# Patient Record
Sex: FEMALE | Race: WHITE | Employment: FULL TIME | URBAN - METROPOLITAN AREA
[De-identification: names, ages, dates, MRNs, and addresses within clinical notes are randomized per-mention and may not be internally consistent; named-entity substitution may affect disease eponyms.]

---

## 2017-01-11 ENCOUNTER — HOSPITAL ENCOUNTER (OUTPATIENT)
Age: 62
Discharge: HOME | End: 2017-01-11
Payer: COMMERCIAL

## 2017-01-11 VITALS
BODY MASS INDEX: 29.82 KG/M2 | DIASTOLIC BLOOD PRESSURE: 85 MMHG | HEIGHT: 67 IN | TEMPERATURE: 98 F | WEIGHT: 190 LBS | HEART RATE: 78 BPM | RESPIRATION RATE: 20 BRPM | SYSTOLIC BLOOD PRESSURE: 147 MMHG | OXYGEN SATURATION: 100 %

## 2017-01-11 DIAGNOSIS — J02.0 STREPTOCOCCAL SORE THROAT: Primary | ICD-10-CM

## 2017-01-11 LAB — S PYO AG THROAT QL: POSITIVE

## 2017-01-11 PROCEDURE — 99214 OFFICE O/P EST MOD 30 MIN: CPT

## 2017-01-11 PROCEDURE — 99213 OFFICE O/P EST LOW 20 MIN: CPT

## 2017-01-11 PROCEDURE — 87430 STREP A AG IA: CPT

## 2017-01-11 RX ORDER — LISINOPRIL 10 MG/1
10 TABLET ORAL DAILY
COMMUNITY

## 2017-01-11 RX ORDER — AMOXICILLIN 875 MG/1
875 TABLET, COATED ORAL 2 TIMES DAILY
Qty: 20 TABLET | Refills: 0 | Status: SHIPPED | OUTPATIENT
Start: 2017-01-11 | End: 2017-01-21

## 2017-01-11 NOTE — ED PROVIDER NOTES
Patient presents with:  Cough/URI      HPI:     Shante Hinojosa is a 62year old female who presents for evaluation of a chief complaint of sore throat the past couple days with body aches and chills. No fevers.   She states she has had intermittent URI redness noted, uvula midline and airway patent  LUNGS: clear to auscultation bilaterally; no rales, rhonchi, or wheezes    MDM/Assessment/Plan:   Orders for this encounter:      Orders Placed This Encounter  POCT Rapid Strep  amoxicillin 875 MG Oral Tab

## 2017-02-13 ENCOUNTER — HOSPITAL ENCOUNTER (OUTPATIENT)
Age: 62
Discharge: HOME | End: 2017-02-13
Attending: EMERGENCY MEDICINE
Payer: COMMERCIAL

## 2017-02-13 VITALS
OXYGEN SATURATION: 100 % | HEART RATE: 81 BPM | DIASTOLIC BLOOD PRESSURE: 83 MMHG | HEIGHT: 67 IN | SYSTOLIC BLOOD PRESSURE: 135 MMHG | TEMPERATURE: 98 F | BODY MASS INDEX: 28.25 KG/M2 | WEIGHT: 180 LBS | RESPIRATION RATE: 20 BRPM

## 2017-02-13 DIAGNOSIS — H01.00B BLEPHARITIS OF UPPER AND LOWER EYELIDS OF BOTH EYES, UNSPECIFIED TYPE: Primary | ICD-10-CM

## 2017-02-13 DIAGNOSIS — H01.00A BLEPHARITIS OF UPPER AND LOWER EYELIDS OF BOTH EYES, UNSPECIFIED TYPE: Primary | ICD-10-CM

## 2017-02-13 PROCEDURE — 99214 OFFICE O/P EST MOD 30 MIN: CPT

## 2017-02-13 PROCEDURE — 99213 OFFICE O/P EST LOW 20 MIN: CPT

## 2017-02-13 RX ORDER — KETOTIFEN FUMARATE 0.35 MG/ML
1 SOLUTION/ DROPS OPHTHALMIC 2 TIMES DAILY
Qty: 10 ML | Refills: 0 | Status: SHIPPED | OUTPATIENT
Start: 2017-02-13 | End: 2017-02-23

## 2017-02-13 NOTE — ED INITIAL ASSESSMENT (HPI)
Pt reports irritation around eyes for about 3 weeks. States skin itching and flaking. Eye lids and under both eyes.  Denies change in makeup and detergent

## 2017-02-13 NOTE — ED PROVIDER NOTES
Patient presents with:  Rash Skin Problem (integumentary)      HPI:     Mary Reyes is a 62year old female who presents today with a chief complaint of bilateral eyelid swelling and itchy sensation. Onset of symptoms was in the past week.     Sympto (two) times daily. Dispense:  10 mL   Refill:  0    Labs performed this visit:  No results found for this or any previous visit (from the past 10 hour(s)). Diagnosis:    ICD-10-CM    1.  Blepharitis of upper and lower eyelids of both eyes, unspecified

## 2017-04-06 ENCOUNTER — HOSPITAL ENCOUNTER (OUTPATIENT)
Age: 62
Discharge: HOME | End: 2017-04-06
Attending: EMERGENCY MEDICINE
Payer: COMMERCIAL

## 2017-04-06 VITALS
BODY MASS INDEX: 28.25 KG/M2 | RESPIRATION RATE: 18 BRPM | SYSTOLIC BLOOD PRESSURE: 149 MMHG | HEART RATE: 69 BPM | DIASTOLIC BLOOD PRESSURE: 76 MMHG | HEIGHT: 67 IN | WEIGHT: 180 LBS | TEMPERATURE: 98 F

## 2017-04-06 DIAGNOSIS — J02.0 STREP PHARYNGITIS: Primary | ICD-10-CM

## 2017-04-06 PROCEDURE — 99214 OFFICE O/P EST MOD 30 MIN: CPT

## 2017-04-06 PROCEDURE — 87430 STREP A AG IA: CPT

## 2017-04-06 PROCEDURE — 99213 OFFICE O/P EST LOW 20 MIN: CPT

## 2017-04-06 RX ORDER — LISINOPRIL 20 MG/1
TABLET ORAL
Refills: 0 | COMMUNITY
Start: 2017-01-19 | End: 2017-04-06

## 2017-04-06 RX ORDER — PENICILLIN V POTASSIUM 500 MG/1
500 TABLET ORAL 4 TIMES DAILY
Qty: 40 TABLET | Refills: 0 | Status: SHIPPED | OUTPATIENT
Start: 2017-04-06 | End: 2017-04-16

## 2017-04-06 NOTE — ED INITIAL ASSESSMENT (HPI)
Couple days of bilateral ear pressure, sore throat, sinus pressure. No fever. C/o dizziness with bending over. No N/V/D.  Recently traveled to [facility].

## 2017-04-06 NOTE — ED PROVIDER NOTES
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 Cardiovascular: Normal rate, regular rhythm and normal heart sounds. Pulmonary/Chest: Effort normal.   Musculoskeletal: Normal range of motion. Lymphadenopathy:     She has cervical adenopathy.    Neurological: She is alert and o

## 2017-05-05 ENCOUNTER — HOSPITAL ENCOUNTER (OUTPATIENT)
Age: 62
Discharge: HOME | End: 2017-05-05
Payer: COMMERCIAL

## 2017-05-05 VITALS
HEART RATE: 101 BPM | TEMPERATURE: 98 F | OXYGEN SATURATION: 100 % | SYSTOLIC BLOOD PRESSURE: 149 MMHG | RESPIRATION RATE: 18 BRPM | DIASTOLIC BLOOD PRESSURE: 84 MMHG

## 2017-05-05 DIAGNOSIS — J02.9 ACUTE VIRAL PHARYNGITIS: Primary | ICD-10-CM

## 2017-05-05 PROCEDURE — 87430 STREP A AG IA: CPT

## 2017-05-05 PROCEDURE — 99213 OFFICE O/P EST LOW 20 MIN: CPT

## 2017-05-05 PROCEDURE — 99214 OFFICE O/P EST MOD 30 MIN: CPT

## 2017-05-05 RX ORDER — AZITHROMYCIN 500 MG/1
500 TABLET, FILM COATED ORAL DAILY
Qty: 5 TABLET | Refills: 0 | Status: SHIPPED | OUTPATIENT
Start: 2017-05-05 | End: 2017-05-10

## 2017-05-05 NOTE — ED INITIAL ASSESSMENT (HPI)
Patient presents with c/o sore throat since yesterday. Denies fevers. Patient has strep throat last month. +body aches and headache.

## 2017-05-05 NOTE — ED PROVIDER NOTES
Patient presents with:  Sore Throat      HPI:     Stacie Carreon is a 65year old female who presents for evaluation of a chief complaint of sore throat since yesterday.   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

## 2017-09-01 ENCOUNTER — HOSPITAL ENCOUNTER (EMERGENCY)
Facility: HOSPITAL | Age: 62
Discharge: HOME | End: 2017-09-01
Attending: EMERGENCY MEDICINE
Payer: OTHER MISCELLANEOUS

## 2017-09-01 VITALS
RESPIRATION RATE: 18 BRPM | HEART RATE: 82 BPM | TEMPERATURE: 98 F | DIASTOLIC BLOOD PRESSURE: 65 MMHG | OXYGEN SATURATION: 99 % | WEIGHT: 185 LBS | HEIGHT: 66 IN | BODY MASS INDEX: 29.73 KG/M2 | SYSTOLIC BLOOD PRESSURE: 148 MMHG

## 2017-09-01 DIAGNOSIS — S91.012A: Primary | ICD-10-CM

## 2017-09-01 PROCEDURE — 90471 IMMUNIZATION ADMIN: CPT

## 2017-09-01 PROCEDURE — 99283 EMERGENCY DEPT VISIT LOW MDM: CPT

## 2017-09-01 PROCEDURE — 12002 RPR S/N/AX/GEN/TRNK2.6-7.5CM: CPT

## 2017-09-01 NOTE — ED NOTES
V shaped laceration visible on back of left ankle with noticeable flap of skin loose. Bleeding is controlled at this time and pt denies need for pain medication. Pt is ambulatory to cart and wound was irrigated with normal saline. Pt tolerated well.  Carmen Badillo

## 2017-09-01 NOTE — ED NOTES
Wound Sutured by dr Shagufta Hewitt, bacitracin applied and lac covered.  Pt is ambulatory with steady gait, no distress

## 2017-09-01 NOTE — ED INITIAL ASSESSMENT (HPI)
Pt c/o lac to left ankle. Pt states she was moving furniture at work this am when a door to a cabinet swung open and cut the back of her left ankle.

## 2017-09-01 NOTE — ED PROVIDER NOTES
Patient presents with:  Laceration Abrasion (integumentary)      HPI:     Amie Land is a 65year old female presents for a chief complaint of laceration evaluation and repair. Injury occurred  Onset today.   Location:  ankle - left medial. [place] [id number] [phone]    Schedule an appointment as soon as possible for a visit in 1 week  For suture removal in 7-10 days

## 2017-10-03 ENCOUNTER — TELEPHONE (OUTPATIENT)
Dept: INTERNAL MEDICINE CLINIC | Facility: CLINIC | Age: 62
End: 2017-10-03

## 2017-11-09 ENCOUNTER — TELEPHONE (OUTPATIENT)
Dept: INTERNAL MEDICINE CLINIC | Facility: CLINIC | Age: 62
End: 2017-11-09

## (undated) NOTE — ED AVS SNAPSHOT
[facility] AND [facility] Immediate Care in [address] [address] Girish Guevara    Phone:  [phone]    Fax:  [phone]           Wade Sohan   MRN: [id number]    Department:  [facility] AND [facility] in [address] [place] [address]   Date of Visit: It is our goal to assure that you are completely satisfied with every aspect of your visit today.   In an effort to constantly improve our service to you, we would appreciate any positive or negative feedback related to the care you received in our Immedi[facility] [employer] account. You may have had testing done that requires us to contact you. Please make sure we have your correct phone number on file.      OUR CURRENT HOURS OF OPERATION:  MONDAY THROUGH FRIDAY 8AM - 8PM  WEEKENDS AND HOLIDAYS 8AM - 6PM    I certifi visit,  view other health information, and more. To sign up or find more information, go to [email or web address] and click on the Sign Up Now link in the [employer] box.      Enter your [employer] Activation Code exactly as it appears below along with yo

## (undated) NOTE — ED AVS SNAPSHOT
Ilia Mehta   MRN: [id number]    Department:  [facility] Emergency Department   Date of Visit:  9/1/2017           Disclosure     Insurance plans vary and the physician(s) referred by the ER may not be covered by your plan.  Please contact CARE PHYSICIAN AT ONCE OR RETURN IMMEDIATELY TO THE EMERGENCY DEPARTMENT. If you have been prescribed any medication(s), please fill your prescription right away and begin taking the medication(s) as directed.   If you believe that any of the medications

## (undated) NOTE — ED AVS SNAPSHOT
[facility] AND [facility] Immediate Care in [address] [place] [address] Girish Guevara    Phone:  [phone]    Fax:  [phone]           Asher Lynn   MRN: [id number]    Department:  [facility] AND [facility] Immediate Care in [address] [place] [address]   Date of Visit: aspect of your visit today. In an effort to constantly improve our service to you, we would appreciate any positive or negative feedback related to the care you received in our Immediate Care. Please call our [facility] at [phone].   Your Immediate contact you. Please make sure we have your correct phone number on file.      OUR CURRENT HOURS OF OPERATION:  [facility]  WEEKENDS AND HOLIDAYS 8AM - 6PM    I certified that I have received a copy of the aftercare instructions; that t [employer] will allow you to access patient instructions from your recent visit,  view other health information, and more. To sign up or find more information, go to [email or web address] and click on the Sign Up Now link in the [employer] box.      Enter

## (undated) NOTE — LETTER
September 1, 2017    Patient: Swathi Gonzalez   Date of Visit: 9/1/2017       To Whom It May Concern:    Reena Verdin was seen and treated in our emergency department on 9/1/2017. She can return to work, keep wound covered until healed.     If you

## (undated) NOTE — LETTER
[facility] IN [place] [address] [address]  Dept: [phone]  Dept Fax: [phone]  Loc: [phone]      April 6, 2017    Patient: Dilcia Friedman   Date of Visit: 4/6/2017       To Whom It May Concern:    Magdalena Fletcher

## (undated) NOTE — ED AVS SNAPSHOT
[facility] AND [facility] Immediate Care in [facility] [address] [address] Girish Guevara    Phone:  [phone]    Fax:  [phone]           Efren Ordonez   MRN: [id number]    Department:  [facility] AND [facility] Immediate Care in [place]   Date of Visit: not participate in your health insurance plan. This may result in a lower benefit level being available to you or other limited reimbursement.   The physician may seek payment directly from you for amounts other than your deductible, co-payment, or co-insu prescription right away and begin taking the medication(s) as directed.   If you believe that any of the medications or instructions on this list is different from what your Primary Care doctor has instructed you - please continue to take your medications a coverage. Patient [address] is a Federal Navigator program that can help with your Affordable Care Act coverage, as well as all types of Medicaid plans.   To get signed up and covered, please call [phone] and ask to get set up for an insuran